# Patient Record
Sex: MALE | Race: OTHER | URBAN - METROPOLITAN AREA
[De-identification: names, ages, dates, MRNs, and addresses within clinical notes are randomized per-mention and may not be internally consistent; named-entity substitution may affect disease eponyms.]

---

## 2020-01-06 ENCOUNTER — OFFICE VISIT (OUTPATIENT)
Dept: FAMILY MEDICINE CLINIC | Age: 25
End: 2020-01-06
Payer: COMMERCIAL

## 2020-01-06 VITALS
SYSTOLIC BLOOD PRESSURE: 118 MMHG | HEART RATE: 98 BPM | WEIGHT: 188 LBS | BODY MASS INDEX: 27.85 KG/M2 | DIASTOLIC BLOOD PRESSURE: 72 MMHG | HEIGHT: 69 IN | OXYGEN SATURATION: 98 % | TEMPERATURE: 97.8 F

## 2020-01-06 PROCEDURE — 99203 OFFICE O/P NEW LOW 30 MIN: CPT | Performed by: FAMILY MEDICINE

## 2020-01-06 RX ORDER — HYDROXYZINE 50 MG/1
50 TABLET, FILM COATED ORAL EVERY 8 HOURS PRN
Qty: 30 TABLET | Refills: 2 | Status: SHIPPED | OUTPATIENT
Start: 2020-01-06 | End: 2020-01-14

## 2020-01-06 RX ORDER — ESCITALOPRAM OXALATE 20 MG/1
20 TABLET ORAL DAILY
Qty: 30 TABLET | Refills: 5 | Status: SHIPPED | OUTPATIENT
Start: 2020-01-06 | End: 2020-12-16

## 2020-01-06 SDOH — HEALTH STABILITY: MENTAL HEALTH: HOW OFTEN DO YOU HAVE A DRINK CONTAINING ALCOHOL?: 4 OR MORE TIMES A WEEK

## 2020-01-06 SDOH — HEALTH STABILITY: MENTAL HEALTH: HOW MANY STANDARD DRINKS CONTAINING ALCOHOL DO YOU HAVE ON A TYPICAL DAY?: 7 TO 9

## 2020-01-06 ASSESSMENT — ENCOUNTER SYMPTOMS
WHEEZING: 0
RHINORRHEA: 0
ABDOMINAL PAIN: 0
SORE THROAT: 0
DIARRHEA: 0
CONSTIPATION: 0
SHORTNESS OF BREATH: 0
COUGH: 0

## 2020-01-06 NOTE — PROGRESS NOTES
[address] [facility] [address] [facility] PRIMARY CARE  [address] [place] [id number]  Dept: [phone]  Dept Fax: : [phone]   Chief Complaint  Chief Complaint   Patient presents with    Anxiety     Discuss anxiety        HPI:  30 y.o.male who presents for establish and anxiety:  (hasn't had PCP in years; here w wife)    Mood: feeling anxious, over thinking things since oct 2019; this has happened in the past but no this bad; getting panic attacks; missing work; more forgetful. 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 The recent holidays made things worse. History reviewed. No pertinent past medical history. History reviewed. No pertinent surgical history.   Social History     Socioeconomic History    Marital status:      Spouse name: Not on file    Number of children: Not on file    Years of education: Not on file    Highest education level: Not on file   Occupational History    Not on file   Social Needs    Financial resource strain: Not on file    Food insecurity:     Worry: Not on file     Inability: Not on file    Transportation needs:     Medical: Not on file     Non-medical: Not on file   Tobacco Use    Smoking status: Light Tobacco Smoker     Types: Cigars   Substance and Sexual Activity    Alcohol use: Yes     Frequency: 4 or more times a week     Drinks per session: 7 to 9     Binge frequency: Daily or almost daily    Drug use: Yes     Types: Marijuana     Comment: daily     Sexual activity: Yes   Lifestyle    Physical activity:     Days per week: Not on file     Minutes per session: Not on file    Stress: Not on file   Relationships    Social connections:     Talks on phone: Not on file     Gets together: Not on file     Attends Denominational service: Not on file     Active member of club or organization: Not on file     Attends meetings of clubs or organizations: Not on file     Relationship status: Not on file    Intimate partner violence:     Fear of current or ex partner: Not on file     Emotionally abused: Not on file     Physically abused: Not on file     Forced sexual activity: Not on file   Other Topics Concern    Not on file   Social History Narrative    Not on file     No Known Allergies  Current Outpatient Medications   Medication Sig Dispense Refill    escitalopram (LEXAPRO) 20 MG tablet Take 1 tablet by mouth daily 1/2 tab daily x1 week and then 1 tab daily 30 tablet 5    hydrOXYzine (ATARAX) 50 MG tablet Take 1 tablet by mouth every 8 hours as needed for Anxiety 30 tablet 2     No current facility-administered medications for this visit. ROS:  Review of Systems   Constitutional: Negative for chills and fever. HENT: Negative for rhinorrhea and sore throat. Respiratory: Negative for cough, shortness of breath and wheezing. Gastrointestinal: Negative for abdominal pain, constipation and diarrhea. Endocrine: Negative for polydipsia and polyuria. Genitourinary: Negative for dysuria, frequency and urgency. Neurological: Negative for syncope, light-headedness, numbness and headaches. Psychiatric/Behavioral: Positive for dysphoric mood and sleep disturbance. The patient is nervous/anxious. Vitals:    01/06/20 1326   BP: 118/72   Site: Left Upper Arm   Position: Sitting   Cuff Size: Medium Adult   Pulse: 98   Temp: 97.8 °F (36.6 °C)   TempSrc: Temporal   SpO2: 98%   Weight: 188 lb (85.3 kg)   Height: 5' 9\" (1.753 m)       Physical exam:  Physical Exam  Vitals signs reviewed. Constitutional:       General: He is not in acute distress. Appearance: He is well-developed. HENT:      Head: Normocephalic and atraumatic.       Mouth/Throat: Pharynx: No oropharyngeal exudate. Neck:      Musculoskeletal: Normal range of motion. Thyroid: No thyromegaly. Cardiovascular:      Rate and Rhythm: Normal rate and regular rhythm. Heart sounds: Normal heart sounds. No murmur. Pulmonary:      Effort: Pulmonary effort is normal. No respiratory distress. Breath sounds: Normal breath sounds. No wheezing. Abdominal:      General: There is no distension. Palpations: Abdomen is soft. Tenderness: There is no tenderness. There is no guarding or rebound. Lymphadenopathy:      Cervical: No cervical adenopathy. Skin:     General: Skin is warm and dry. Neurological:      Mental Status: He is alert and oriented to person, place, and time. Psychiatric:         Attention and Perception: Attention normal.         Mood and Affect: Mood is anxious. Speech: Speech normal.         Behavior: Behavior normal.         Thought Content: Thought content normal.         Assessment/Plan:  30 y.o. male here mainly for establish and anxiety:  - Anxiety: ok to fill out FMLA for days missed and a week following today for missed days; RTC 2 weeks; started lexapro with fast titration and prn hydoxyzine  - encouraged talking with counselor which he will consider     Diagnosis Orders   1. Anxiety  escitalopram (LEXAPRO) 20 MG tablet    hydrOXYzine (ATARAX) 50 MG tablet    Diego Mcguire, PhD, Psychology, [place]   2. Establishing care with new doctor, encounter for          Return in about 2 weeks (around 1/20/2020) for mood.     Orquidea Anderson MD

## 2020-01-14 ENCOUNTER — OFFICE VISIT (OUTPATIENT)
Dept: FAMILY MEDICINE CLINIC | Age: 25
End: 2020-01-14
Payer: COMMERCIAL

## 2020-01-14 ENCOUNTER — TELEPHONE (OUTPATIENT)
Dept: FAMILY MEDICINE CLINIC | Age: 25
End: 2020-01-14

## 2020-01-14 VITALS
WEIGHT: 180 LBS | SYSTOLIC BLOOD PRESSURE: 120 MMHG | TEMPERATURE: 97 F | BODY MASS INDEX: 26.66 KG/M2 | HEART RATE: 85 BPM | OXYGEN SATURATION: 98 % | HEIGHT: 69 IN | DIASTOLIC BLOOD PRESSURE: 88 MMHG

## 2020-01-14 PROBLEM — F12.90 MARIJUANA USER: Status: ACTIVE | Noted: 2020-01-14

## 2020-01-14 PROBLEM — F32.A DEPRESSION: Status: ACTIVE | Noted: 2020-01-14

## 2020-01-14 PROBLEM — F41.9 ANXIETY: Status: ACTIVE | Noted: 2020-01-14

## 2020-01-14 PROCEDURE — 99214 OFFICE O/P EST MOD 30 MIN: CPT | Performed by: FAMILY MEDICINE

## 2020-01-14 RX ORDER — CLONAZEPAM 0.5 MG/1
0.5 TABLET ORAL DAILY PRN
Qty: 10 TABLET | Refills: 0 | Status: SHIPPED | OUTPATIENT
Start: 2020-01-14 | End: 2020-12-16

## 2020-01-14 RX ORDER — QUETIAPINE FUMARATE 25 MG/1
25 TABLET, FILM COATED ORAL NIGHTLY
Qty: 30 TABLET | Refills: 1 | Status: SHIPPED | OUTPATIENT
Start: 2020-01-14 | End: 2020-12-16

## 2020-01-14 ASSESSMENT — ENCOUNTER SYMPTOMS
CONSTIPATION: 0
RHINORRHEA: 0
DIARRHEA: 0
WHEEZING: 0
SHORTNESS OF BREATH: 0
SORE THROAT: 0
COUGH: 0
ABDOMINAL PAIN: 0

## 2020-01-14 NOTE — LETTER
[facility] Primary [facility]  [address] [id number]  Phone: [phone]  Fax: [phone]    Orquidea Anderson MD        January 15, 2020     Patient: Brock Anguiano   YOB: 1995   Date of Visit: 1/14/2020       To Whom It May Concern:    Please excuse for the absence 1/13/2020 - 1/17/2020. He may return to work on 1/20/2020. If you have any questions or concerns, please don't hesitate to call.     Sincerely,        Orquidea Anderson MD

## 2020-01-14 NOTE — TELEPHONE ENCOUNTER
Received call from Pt's wife, Sukhi Elizabeth, stating that Pt did not feel a difference with 1 tablet of klonopin and has been taking 2. Pt's wife would like to know if this is okay and would also like to know why Rx was only for 10 days. Pt's wife would like a call back today. Please advise.

## 2020-01-14 NOTE — TELEPHONE ENCOUNTER
He may take 2 tabs but he will run out sooner. Rx was written for 10 day but is intended to be stretched as long as possible. Plus we were planning on talking again in a week.

## 2020-12-16 ENCOUNTER — VIRTUAL VISIT (OUTPATIENT)
Dept: FAMILY MEDICINE CLINIC | Age: 25
End: 2020-12-16
Payer: COMMERCIAL

## 2020-12-16 ENCOUNTER — TELEPHONE (OUTPATIENT)
Dept: FAMILY MEDICINE CLINIC | Age: 25
End: 2020-12-16

## 2020-12-16 PROCEDURE — 99213 OFFICE O/P EST LOW 20 MIN: CPT | Performed by: FAMILY MEDICINE

## 2020-12-16 ASSESSMENT — ENCOUNTER SYMPTOMS
SHORTNESS OF BREATH: 0
ABDOMINAL PAIN: 0
COUGH: 0
WHEEZING: 0
RHINORRHEA: 0
CONSTIPATION: 0
DIARRHEA: 0
SORE THROAT: 0

## 2020-12-16 NOTE — PROGRESS NOTES
Substance Use Topics    Alcohol use: Yes     Frequency: 4 or more times a week     Drinks per session: 7 to 9     Binge frequency: Daily or almost daily    Drug use: Yes     Types: Marijuana     Comment: daily         No Known Allergies, No past medical history on file., No past surgical history on file.,   Social History     Tobacco Use    Smoking status: Light Tobacco Smoker     Types: Cigars    Smokeless tobacco: Never Used   Substance Use Topics    Alcohol use: Yes     Frequency: 4 or more times a week     Drinks per session: 7 to 9     Binge frequency: Daily or almost daily    Drug use: Yes     Types: Marijuana     Comment: daily    , No family history on file.,   There is no immunization history on file for this patient. PHYSICAL EXAMINATION:  [ INSTRUCTIONS:  \"[x]\" Indicates a positive item  \"[]\" Indicates a negative item  -- DELETE ALL ITEMS NOT EXAMINED]  [x] Alert  [x] Oriented to person/place/time    [x] No apparent distress  [] Toxic appearing    [] Face flushed appearing [] Sclera clear  [] Lips are cyanotic      [x] Breathing appears normal  [] Appears tachypneic      [] Rash on visible skin    [] Cranial Nerves II-XII grossly intact    [] Motor grossly intact in visible upper extremities    [] Motor grossly intact in visible lower extremities    [x] Normal Mood  [] Anxious appearing    [] Depressed appearing  [] Confused appearing      [] Poor short term memory  [] Poor long term memory    [] OTHER:      Due to this being a TeleHealth encounter, evaluation of the following organ systems is limited: Vitals/Constitutional/EENT/Resp/CV/GI//MS/Neuro/Skin/Heme-Lymph-Imm. ASSESSMENT/PLAN:  - Suspected COVID19; FMLA filled out for the specified dates    1. Suspected COVID-19 virus infection        Return if symptoms worsen or fail to improve. An  electronic signature was used to authenticate this note.     --Guillermo Stein MD on 12/16/2020 at 4:41 PM Pursuant to the emergency declaration under the [facility] [place]note 2[place] waiver authority and the [employer] and [employer] Act, this Virtual  Visit was conducted, with patient's consent, to reduce the patient's risk of exposure to COVID-19 and provide continuity of care for an established patient. Services were provided through a video synchronous discussion virtually to substitute for in-person clinic visit.

## 2020-12-18 NOTE — PROGRESS NOTES
Active member of club or organization: Not on file     Attends meetings of clubs or organizations: Not on file     Relationship status: Not on file    Intimate partner violence:     Fear of current or ex partner: Not on file     Emotionally abused: Not on file     Physically abused: Not on file     Forced sexual activity: Not on file   Other Topics Concern    Not on file   Social History Narrative    Not on file     No Known Allergies  Current Outpatient Medications   Medication Sig Dispense Refill    clonazePAM (KLONOPIN) 0.5 MG tablet Take 1 tablet by mouth daily as needed for Anxiety for up to 10 days. 10 tablet 0    QUEtiapine (SEROQUEL) 25 MG tablet Take 1 tablet by mouth nightly 30 tablet 1    escitalopram (LEXAPRO) 20 MG tablet Take 1 tablet by mouth daily 1/2 tab daily x1 week and then 1 tab daily 30 tablet 5     No current facility-administered medications for this visit. ROS:  Review of Systems   Constitutional: Negative for chills and fever. HENT: Negative for rhinorrhea and sore throat. Respiratory: Negative for cough, shortness of breath and wheezing. Gastrointestinal: Negative for abdominal pain, constipation and diarrhea. Endocrine: Negative for polydipsia and polyuria. Genitourinary: Negative for dysuria, frequency and urgency. Neurological: Negative for syncope, light-headedness, numbness and headaches. Psychiatric/Behavioral: Positive for decreased concentration and sleep disturbance. The patient is nervous/anxious. Vitals:    01/14/20 0927   BP: 120/88   Site: Left Upper Arm   Position: Sitting   Cuff Size: Medium Adult   Pulse: 85   Temp: 97 °F (36.1 °C)   TempSrc: Temporal   SpO2: 98%   Weight: 180 lb (81.6 kg)   Height: 5' 9\" (1.753 m)       Physical exam:  Physical Exam  Vitals signs reviewed. Constitutional:       General: He is not in acute distress. Appearance: He is well-developed. HENT:      Head: Normocephalic and atraumatic.    Neck: Musculoskeletal: Normal range of motion. Cardiovascular:      Rate and Rhythm: Normal rate. Pulmonary:      Effort: Pulmonary effort is normal. No respiratory distress. Skin:     General: Skin is warm and dry. Neurological:      Mental Status: He is alert and oriented to person, place, and time. Psychiatric:         Attention and Perception: Attention normal.         Mood and Affect: Mood is anxious and depressed. Speech: Speech normal.         Behavior: Behavior normal.         Thought Content: Thought content normal.         Assessment/Plan:  30 y.o. male here mainly for Mood:  - Mood: poorly controlled and causing much distress at home. Current daily marijuana user but very forthcoming about this. Needs to give the lexapro more time to help. Starting seroquel nightly. Agreeable to stopping the marijuana now with plans for a utox in 2mo AND in exchanged will trial a limited supply of klonopin (we discussed risks and my limits on prescribing benzos). We discussed that marijuana can actually worsen anxiety/depression in some cases. Diagnosis Orders   1. Anxiety  clonazePAM (KLONOPIN) 0.5 MG tablet    QUEtiapine (SEROQUEL) 25 MG tablet   2. Other depression  QUEtiapine (SEROQUEL) 25 MG tablet   3. Marijuana user          Return if symptoms worsen or fail to improve.     Patricia Stern MD Autoimmune hemolytic anemia